# Patient Record
Sex: FEMALE | Race: WHITE
[De-identification: names, ages, dates, MRNs, and addresses within clinical notes are randomized per-mention and may not be internally consistent; named-entity substitution may affect disease eponyms.]

---

## 2017-03-03 ENCOUNTER — HOSPITAL ENCOUNTER (INPATIENT)
Dept: HOSPITAL 68 - GNO | Age: 27
LOS: 2 days | DRG: 560 | End: 2017-03-05
Attending: SPECIALIST | Admitting: SPECIALIST
Payer: COMMERCIAL

## 2017-03-03 VITALS — DIASTOLIC BLOOD PRESSURE: 57 MMHG | SYSTOLIC BLOOD PRESSURE: 126 MMHG

## 2017-03-03 VITALS — WEIGHT: 293 LBS | HEIGHT: 65 IN | BODY MASS INDEX: 48.82 KG/M2

## 2017-03-03 DIAGNOSIS — O48.0: Primary | ICD-10-CM

## 2017-03-03 DIAGNOSIS — Z3A.41: ICD-10-CM

## 2017-03-03 LAB
ABSOLUTE GRANULOCYTE CT: 5.1 /CUMM (ref 1.4–6.5)
BASOPHILS # BLD: 0.1 /CUMM (ref 0–0.2)
BASOPHILS NFR BLD: 0.9 % (ref 0–2)
EOSINOPHIL # BLD: 0.1 /CUMM (ref 0–0.7)
EOSINOPHIL NFR BLD: 1.2 % (ref 0–5)
ERYTHROCYTE [DISTWIDTH] IN BLOOD BY AUTOMATED COUNT: 16 % (ref 11.5–14.5)
GRANULOCYTES NFR BLD: 69.2 % (ref 42.2–75.2)
HCT VFR BLD CALC: 36.6 % (ref 37–47)
LYMPHOCYTES # BLD: 1.5 /CUMM (ref 1.2–3.4)
MCH RBC QN AUTO: 29.3 PG (ref 27–31)
MCHC RBC AUTO-ENTMCNC: 34.2 G/DL (ref 33–37)
MCV RBC AUTO: 85.5 FL (ref 81–99)
MONOCYTES # BLD: 0.6 /CUMM (ref 0.1–0.6)
PLATELET # BLD: 232 /CUMM (ref 130–400)
PMV BLD AUTO: 8.5 FL (ref 7.4–10.4)
RED BLOOD CELL CT: 4.28 /CUMM (ref 4.2–5.4)
WBC # BLD AUTO: 7.4 /CUMM (ref 4.8–10.8)

## 2017-03-03 PROCEDURE — 0HQ9XZZ REPAIR PERINEUM SKIN, EXTERNAL APPROACH: ICD-10-PCS | Performed by: SPECIALIST

## 2017-03-03 PROCEDURE — 3E033VJ INTRODUCTION OF OTHER HORMONE INTO PERIPHERAL VEIN, PERCUTANEOUS APPROACH: ICD-10-PCS | Performed by: SPECIALIST

## 2017-03-03 NOTE — LABOR & DELIVERY SUMMARY
Delivery Summary
Vaginal Delivery:
   Vaginal: vertex
Episiotomy/Lacerations:
   Episiotomy/Lacerations: 1ST
   Type:
3 0
   Repair:
IN LAYERS 
   Anesthesia:
EPIDURAL
Placenta:
   Placenta: spontanteous, normal, 3 vessel
Anesthesia: block
Additional Comments:
 WITHOUT MECONIUM [id number] DEGREE LACERATION . PLACENTA BY CCT INTACT HEAVY 
BLEEDING THAT DID NOT RESPOND TO MASSAGE METHERGINE GIVEN .A+

## 2017-03-03 NOTE — PN- OBSTETRICAL
Subjective
Subjective:
NO COMPLAINTS
 
Objective
Last 24 Hrs of Vital Signs/I&O
 Intake & Output
 
 
 03/03 1600 03/03 0800 03/03 0000
 
Intake Total   
 
Output Total   
 
Balance   
 
    
 
Patient 293 lb  
 
Weight   
 
 
 
Physical Exam:
PE OBESE WF
ABD SOFT EFW 3700
EXT+1EDEMA
 
Obstetric Exam
Dilation (cm): 3
Effacement (%): 80
Station: 0
Membranes: intact
Fluid: unknown
Multiple Gestation? No
Contractions:
NONE
 
Assessment/Plan
Assessment/Plan
ASSESS 41 WEEK 
PLAN PITOCIN

## 2017-03-04 LAB
ABSOLUTE GRANULOCYTE CT: 6.2 /CUMM (ref 1.4–6.5)
BASOPHILS # BLD: 0.2 /CUMM (ref 0–0.2)
BASOPHILS NFR BLD: 2 % (ref 0–2)
EOSINOPHIL # BLD: 0.2 /CUMM (ref 0–0.7)
EOSINOPHIL NFR BLD: 1.8 % (ref 0–5)
ERYTHROCYTE [DISTWIDTH] IN BLOOD BY AUTOMATED COUNT: 15.8 % (ref 11.5–14.5)
GRANULOCYTES NFR BLD: 67.7 % (ref 42.2–75.2)
HCT VFR BLD CALC: 32 % (ref 37–47)
LYMPHOCYTES # BLD: 1.8 /CUMM (ref 1.2–3.4)
MCH RBC QN AUTO: 29.4 PG (ref 27–31)
MCHC RBC AUTO-ENTMCNC: 34 G/DL (ref 33–37)
MCV RBC AUTO: 86.3 FL (ref 81–99)
MONOCYTES # BLD: 0.9 /CUMM (ref 0.1–0.6)
PLATELET # BLD: 224 /CUMM (ref 130–400)
PMV BLD AUTO: 8.7 FL (ref 7.4–10.4)
RED BLOOD CELL CT: 3.71 /CUMM (ref 4.2–5.4)
WBC # BLD AUTO: 9.2 /CUMM (ref 4.8–10.8)

## 2017-03-04 NOTE — PN- POST DELIVERY/GYN
Subjective
Subjective:
Overal doing very well.  With no complaints
Ambulating, voiding, tolerating PO with no N/V. Pain controlled with PO meds. 
Lochia is minimal. Passing gas. Breastfeeding well.
Review of Systems:
per above
 
Objective
Last 24 Hrs of Vital Signs/I&O
 Vital Signs
 
 
Date Time Temp Pulse Resp B/P Pulse O2 O2 Flow FiO2
 
     Ox Delivery Rate 
 
 98.0 101 18 114/60 99%   
 
 
 
Physical Exam:
Gen: NAD
Heart: RRR, S1 and S2
Lungs: CTAB
Abd: Good BS. Non tender. Fundus below umbilicus
Ext: +1 pitting edema. No erythema
Current Medications:
 Current Medications
 
 
  Sig/Theodora Start time  Last
 
Medication Dose Route Stop Time Status Admin
 
Acetaminophen 650 MG Q4P PRN  1615 AC 
 
  PO   
 
Docusate Sodium 100 MG BID PRN  1615 AC 
 
  PO   
 
Enoxaparin Sodium 40 MG DAILY  1000 AC 
 
  SC   0849
 
Hydroxyzine HCl 50 MG AT BEDTIME AS NEED..  1615 AC 
 
  PO   
 
Ibuprofen 800 MG Q6P PRN  1615 AC 
 
  PO   0848
 
Lactated Ringer's 1,000 ML Q8H  0830 DC 
 
  IV   1321
 
Magnesium Hydroxide 30 ML DAILY PRN  1615 DC 
 
  PO  1001  
 
Methylergonovine  0.2 MG STAT STA  1614 DC 
 
Maleate  IM  1615  1607
 
Oxycodone/ 1 TAB Q3P PRN  1615 AC 
 
Acetaminophen  PO   
 
Oxytocin 20 UNITS Q5H  1615 DC 
 
Lactated Ringer's 1,000 ML IV  2114  1625
 
Oxytocin 30 UNITS PER PROTOCL  0830 DC 
 
Lactated Ringer's 500 ML IV   0903
 
Senna 374 MG AT BEDTIME AS NEED..  1615 AC 
 
  PO  1616  
 
 
 
Last 24 Hrs of Labs/Blas:
 Laboratory Tests
 
17 0645:
CBC w Diff NO MAN DIFF REQ, RBC 3.71  L, MCV 86.3, MCH 29.4, RDW 15.8  H, MPV 
8.7, Gran % 67.7, Lymphocytes % 19.2  L, Monocytes % 9.3, Eosinophils % 1.8, 
Basophils % 2.0, Absolute Granulocytes 6.2, Absolute Lymphocytes 1.8, Absolute 
Monocytes 0.9  H, Absolute Eosinophils 0.2, Absolute Basophils 0.2, PUBS MCHC 
34.0
 Microbiology
 1258  URINE ROUT: Urine Culture - RES
 
 
 
Assessment/Plan
Assessment/Plan
PPD#1 s/p , overall doing very well
 
1) Postpartum
Doing well
Continue regular diet
Continue to ambulate
Continue to breastfeed
Continue PO meds PRN for pain control
 
2) Prophx
Continue lovenox
Bowel regimen PRN
 
Plan to discharge tomorrow
 
 
Attending MD Review Statement
 
Attending Statement
Attending MD Statement: examined this patient, discussed with family, discussed 
with nursing

## 2017-03-05 NOTE — PN- POST DELIVERY/GYN
Subjective
Subjective:
Overal doing very well.  With no complaints
Ambulating, voiding, tolerating PO with no N/V. Pain controlled with PO meds. 
Lochia is minimal. Passing gas. No BM. Breastfeeding well.
Review of Systems:
per above
 
Objective
Last 24 Hrs of Vital Signs/I&O
Temp: 98.8
HR: 98
RR: 20
BP: 116/74
O2 sat: 97%
Physical Exam:
Gen: NAD
Heart: RRR, S1 and S2
Lungs: CTAB
Abd: Good BS. Non tender. Fundus below umbilicus
Ext: +1 pitting edema. No erythema
Current Medications:
 Current Medications
 
 
  Sig/Theodora Start time  Last
 
Medication Dose Route Stop Time Status Admin
 
Acetaminophen 650 MG Q4P PRN  161 AC 
 
  PO   
 
Docusate Sodium 100 MG BID PRN  1615 AC 
 
  PO   
 
Enoxaparin Sodium 40 MG DAILY  1000 AC 
 
  SC   0849
 
Hydroxyzine HCl 50 MG AT BEDTIME AS NEED..  1615 AC 
 
  PO   
 
Ibuprofen 800 MG .STK-MED ONE  2325 DC 
 
  PO  2326  
 
Ibuprofen 800 MG Q6P PRN  1615 AC 
 
  PO   0000
 
Magnesium Hydroxide 30 ML DAILY PRN  1615 DC 
 
  PO  1001  
 
Oxycodone/ 1 TAB Q3P PRN  1615 AC 
 
Acetaminophen  PO   
 
Senna 374 MG AT BEDTIME AS NEED..  1615 DC 
 
  PO  1616  
 
 
 
 
Assessment/Plan
Assessment/Plan
PPD#2 s/p , overall doing very well
 
1) Postpartum
Doing well
Continue regular diet
Continue to ambulate
Continue to breastfeed
Continue PO meds PRN for pain control
 
2) Prophx
Continue lovenox
Bowel regimen PRN
 
Plan to discharge today. Instructions and precuations given to patient and 
family member. Voiced understanding.
 
 
Attending MD Review Statement
 
Attending Statement
Attending MD Statement: examined this patient, discussed with family, discussed 
with nursing

## 2018-07-30 ENCOUNTER — HOSPITAL ENCOUNTER (EMERGENCY)
Dept: HOSPITAL 68 - ERH | Age: 28
End: 2018-07-30
Payer: COMMERCIAL

## 2018-07-30 VITALS — BODY MASS INDEX: 43.82 KG/M2 | HEIGHT: 65 IN | WEIGHT: 263 LBS

## 2018-07-30 VITALS — SYSTOLIC BLOOD PRESSURE: 114 MMHG | DIASTOLIC BLOOD PRESSURE: 73 MMHG

## 2018-07-30 DIAGNOSIS — F10.10: ICD-10-CM

## 2018-07-30 DIAGNOSIS — R55: ICD-10-CM

## 2018-07-30 DIAGNOSIS — R11.10: Primary | ICD-10-CM

## 2018-07-30 LAB
ABSOLUTE GRANULOCYTE CT: 6.3 /CUMM (ref 1.4–6.5)
BASOPHILS # BLD: 0 /CUMM (ref 0–0.2)
BASOPHILS NFR BLD: 0.2 % (ref 0–2)
EOSINOPHIL # BLD: 0 /CUMM (ref 0–0.7)
EOSINOPHIL NFR BLD: 0.6 % (ref 0–5)
ERYTHROCYTE [DISTWIDTH] IN BLOOD BY AUTOMATED COUNT: 14.6 % (ref 11.5–14.5)
GRANULOCYTES NFR BLD: 81.6 % (ref 42.2–75.2)
HCT VFR BLD CALC: 39.5 % (ref 37–47)
LYMPHOCYTES # BLD: 0.9 /CUMM (ref 1.2–3.4)
MCH RBC QN AUTO: 27 PG (ref 27–31)
MCHC RBC AUTO-ENTMCNC: 32.9 G/DL (ref 33–37)
MCV RBC AUTO: 81.9 FL (ref 81–99)
MONOCYTES # BLD: 0.5 /CUMM (ref 0.1–0.6)
PLATELET # BLD: 324 /CUMM (ref 130–400)
PMV BLD AUTO: 7.9 FL (ref 7.4–10.4)
RED BLOOD CELL CT: 4.82 /CUMM (ref 4.2–5.4)
WBC # BLD AUTO: 7.7 /CUMM (ref 4.8–10.8)

## 2018-07-30 PROCEDURE — G0480 DRUG TEST DEF 1-7 CLASSES: HCPCS

## 2018-07-30 NOTE — ED GENERAL ADULT
History of Present Illness
 
General
Chief Complaint: General Adult
Stated Complaint: DRANK TO MUCH CONTINUES TO PASS OUT AFTER VOMITING
Source: patient, family
Exam Limitations: no limitations
 
Vital Signs & Intake/Output
Vital Signs & Intake/Output
 Vital Signs
 
 
Date Time Temp Pulse Resp B/P B/P Pulse O2 O2 Flow FiO2
 
     Mean Ox Delivery Rate 
 
07/30 1231 97.0 78 18 114/73  96 Room Air  
 
07/30 1130 97.0 80 20 132/74  96 Room Air  
 
07/30 0959 96.2 84 16 117/80  98 Room Air  
 
07/30 0804 97.0 96 20 118/80  96 Room Air  
 
 
 
Allergies
Coded Allergies:
NO KNOWN ALLERGIES (03/03/17)
 
Reconcile Medications
Multivitamin (Daily Multiple Vitamin) 1 EACH TABLET   1 TAB PO DAILY VITAMIN 
SUPPORT  (Reported)
 
Triage Note:
PT STATES SHE DRANK TOO MUCH ALCOHOL LAST NIGHT
 AND WAS MIXING. C/O VOMITING AND THEN PASSING OUT
 AFTERWARDS SINCE 0700
Triage Nurses Notes Reviewed? yes
Onset: Abrupt
Duration: day(s):
Timing: recent history
Pregnant: No
Patient currently breastfeeds: No
HPI:
 
 
 
7/30/18
12:41 PM
28-year-old female presents to the emergency department for several episode of 
vomiting followed by syncope.  The story is confirmed by her .  She 
denies any chest pain or shortness of breath.  She received IV fluid in the 
Emergency Department.  Currently she is asymptomatic.  Labs and EKGs were 
normal.  The first EKG showed first-degree AV block.  Second EKG was normal.  No
shortness of breath.  No headache.  She says she drank wine and smokes 
cigarettes last night.  She also did fireball shots.  She did receive IV fluid 
in the Emergency Department.
 
Past History
 
Travel History
Traveled to [place] past 21 day No
 
Medical History
Any Pertinent Medical History? see below for history
Pneumonia Vaccine: 10/31/13
Influenza Vaccine: 10/31/13
 
Surgical History
Surgical History: non-contributory
 
Psychosocial History
What is your primary language English
Tobacco Use: Current Not Daily
ETOH Use: occasional use
Illicit Drug Use: denies illicit drug use
 
Family History
Hx Contributory? No
 
Review of Systems
 
Review of Systems
Constitutional:
Denies: fever. 
EENTM:
Reports: no symptoms. 
Respiratory:
Denies: short of breath. 
Cardiovascular:
Reports: syncope.  Denies: chest pain. 
GI:
Reports: vomiting.  Denies: abdominal pain. 
Genitourinary:
Reports: no symptoms. 
Musculoskeletal:
Reports: no symptoms. 
Skin:
Reports: no symptoms. 
Neurological/Psychological:
Denies: headache. 
Hematologic/Endocrine:
Reports: no symptoms. 
Immunologic/Allergic:
Reports: no symptoms. 
 
Physical Exam
 
Physical Exam
General Appearance: well developed/nourished, alert, awake, anxious, mild 
distress
Head: atraumatic, normal appearance
Eyes:
Bilateral: normal appearance, PERRL, EOMI. 
Ears, Nose, Throat: normal pharynx, normal ENT inspection
Neck: normal inspection, supple, full range of motion
Respiratory: normal breath sounds, chest non-tender, no respiratory distress
Cardiovascular: regular rate/rhythm
Peripheral Pulses:
4+ radial (R), 4+ radial (L)
Gastrointestinal: soft, non-tender
Back: normal range of motion
Extremities: no edema
Neurologic/Psych: no motor/sensory deficits, awake, alert, oriented x 3
Skin: intact, normal color, warm/dry
 
Core Measures
ACS in differential dx? No
CVA/TIA Diagnosis: No
Sepsis Present: No
Sepsis Focused Exam Completed? No
 
Progress
Differential Diagnoses
I considered the following diagnoses in my evaluation of the patient: [
Dysrhythmia, anemia, ectopic pregnancy, pulmonary embolism, subarachnoid 
hemorrhage]
 
Plan of Care:
 Orders
 
 
Procedure Date/time Status
 
EKG 07/30 1220 Active
 
Add-on Test (ER Only) 07/30 0850 Active
 
URINE DRUG SCREEN FOR ER ONLY 07/30 0848 Complete
 
LIPASE 07/30 0848 Complete
 
HUMAN BETA HCG SCREEN 07/30 0848 Complete
 
ETHANOL 07/30 0848 Complete
 
COMPREHENSIVE METABOLIC PANEL 07/30 0848 Complete
 
CBC WITHOUT DIFFERENTIAL 07/30 0848 Complete
 
EKG 07/30 0848 Active
 
 
 Laboratory Tests
 
 
 
07/30/18 1042:
Urine Opiates Screen < 100, Methadone Screen < 40, Barbiturate Screen < 60, Ur 
Phencyclidine Scrn < 6.00, Amphetamines Screen < 100, U Benzodiazepines Scrn < 
85, Urine Cocaine Screen < 50, Urine Cannabis Screen < 5.00
 
07/30/18 0951:
Anion Gap 11, Estimated GFR > 60, BUN/Creatinine Ratio 18.6, Glucose 106  H, 
Calcium 9.7, Total Bilirubin 0.3, AST 28, ALT 35, Alkaline Phosphatase 85, Total
Protein 7.6, Albumin 4.5, Globulin 3.1, Albumin/Globulin Ratio 1.5, Lipase 50, 
Total Beta HCG NEGATIVE, CBC w Diff NO MAN DIFF REQ, RBC 4.82, MCV 81.9, MCH 
27.0, MCHC 32.9  L, RDW 14.6  H, MPV 7.9, Gran % 81.6  H, Lymphocytes % 11.4  L,
Monocytes % 6.2, Eosinophils % 0.6, Basophils % 0.2, Absolute Granulocytes 6.3, 
Absolute Lymphocytes 0.9  L, Absolute Monocytes 0.5, Absolute Eosinophils 0, 
Absolute Basophils 0, Serum Alcohol < 10.0
 
Initial ED EKG: NSR, first degree AV block
 
Departure
 
Departure
Disposition: HOME OR SELF CARE
Condition: Stable
Clinical Impression
Primary Impression: Vomiting
Secondary Impressions: Alcohol abuse, Syncope
Referrals:
Magalis Gipson (PCP/Family)
 
Departure Forms:
Customer Survey
General Discharge Information
Comments
 
 
 
The patient received IV fluids in the ED.  First EKG showed first-degree AV 
block.  This was repeated and was normal.  Labs essentially unremarkable.  The 
patient felt better.  She had no headache.  No shortness of breath.  Pregnancy 
test was negative.
Suspect adverse reaction to alcohol.  Vasovagal syncope.  Status post vomiting.
She will drink fluids avoid alcohol and follow-up with her doctor this week.
 
 
 
 
 
 
No history of CHF
No anemia
EKG is normal
No shortness of breath
No hypotension
 
 
Critical Care Note
 
Critical Care Note
Critical Care Time: non-applicable